# Patient Record
Sex: MALE | Race: WHITE | NOT HISPANIC OR LATINO | Employment: FULL TIME | URBAN - METROPOLITAN AREA
[De-identification: names, ages, dates, MRNs, and addresses within clinical notes are randomized per-mention and may not be internally consistent; named-entity substitution may affect disease eponyms.]

---
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
## 2020-08-17 ENCOUNTER — TRANSFERRED RECORDS (OUTPATIENT)
Dept: HEALTH INFORMATION MANAGEMENT | Facility: CLINIC | Age: 20
End: 2020-08-17

## 2023-06-03 NOTE — PATIENT INSTRUCTIONS
Take antibiotic as directed- Amoxicillin twice daily for 10 days.  Tylenol and/or ibuprofen for pain relief and fever reduction.  Warm compresses next to ear for pain relief.  Drink plenty of fluids and place a humidifier in bedroom.  Mucinex to help reduce fluid in ears (guiafenasin is the generic).  Ear infections are not contagious.  Swimming is ok as long as there is no perforation in the ear drum.   Follow up with primary care provider in 10-14 days if any concern of persistent infection.   eye pain traumatic

## 2024-07-11 ENCOUNTER — NURSE TRIAGE (OUTPATIENT)
Dept: PEDIATRICS | Facility: OTHER | Age: 24
End: 2024-07-11
Payer: COMMERCIAL

## 2024-07-11 ENCOUNTER — HOSPITAL ENCOUNTER (EMERGENCY)
Facility: CLINIC | Age: 24
Discharge: HOME | End: 2024-07-11
Attending: STUDENT IN AN ORGANIZED HEALTH CARE EDUCATION/TRAINING PROGRAM | Admitting: STUDENT IN AN ORGANIZED HEALTH CARE EDUCATION/TRAINING PROGRAM
Payer: COMMERCIAL

## 2024-07-11 ENCOUNTER — APPOINTMENT (OUTPATIENT)
Dept: GENERAL RADIOLOGY | Facility: CLINIC | Age: 24
End: 2024-07-11
Attending: STUDENT IN AN ORGANIZED HEALTH CARE EDUCATION/TRAINING PROGRAM
Payer: COMMERCIAL

## 2024-07-11 VITALS
SYSTOLIC BLOOD PRESSURE: 124 MMHG | TEMPERATURE: 99.7 F | OXYGEN SATURATION: 98 % | RESPIRATION RATE: 17 BRPM | HEIGHT: 70 IN | DIASTOLIC BLOOD PRESSURE: 73 MMHG | BODY MASS INDEX: 24.11 KG/M2 | HEART RATE: 83 BPM

## 2024-07-11 DIAGNOSIS — J18.9 PNEUMONIA OF RIGHT LOWER LOBE DUE TO INFECTIOUS ORGANISM: ICD-10-CM

## 2024-07-11 LAB
ANION GAP SERPL CALCULATED.3IONS-SCNC: 13 MMOL/L (ref 7–15)
BASOPHILS # BLD AUTO: 0 10E3/UL (ref 0–0.2)
BASOPHILS NFR BLD AUTO: 0 %
BUN SERPL-MCNC: 7.4 MG/DL (ref 6–20)
CALCIUM SERPL-MCNC: 9.6 MG/DL (ref 8.6–10)
CHLORIDE SERPL-SCNC: 98 MMOL/L (ref 98–107)
CREAT SERPL-MCNC: 1 MG/DL (ref 0.67–1.17)
DEPRECATED HCO3 PLAS-SCNC: 27 MMOL/L (ref 22–29)
EGFRCR SERPLBLD CKD-EPI 2021: >90 ML/MIN/1.73M2
EOSINOPHIL # BLD AUTO: 0.1 10E3/UL (ref 0–0.7)
EOSINOPHIL NFR BLD AUTO: 2 %
ERYTHROCYTE [DISTWIDTH] IN BLOOD BY AUTOMATED COUNT: 12.3 % (ref 10–15)
GLUCOSE SERPL-MCNC: 103 MG/DL (ref 70–99)
HCT VFR BLD AUTO: 48.5 % (ref 40–53)
HGB BLD-MCNC: 16.4 G/DL (ref 13.3–17.7)
IMM GRANULOCYTES # BLD: 0.1 10E3/UL
IMM GRANULOCYTES NFR BLD: 1 %
LYMPHOCYTES # BLD AUTO: 1.1 10E3/UL (ref 0.8–5.3)
LYMPHOCYTES NFR BLD AUTO: 15 %
MCH RBC QN AUTO: 27.8 PG (ref 26.5–33)
MCHC RBC AUTO-ENTMCNC: 33.8 G/DL (ref 31.5–36.5)
MCV RBC AUTO: 82 FL (ref 78–100)
MONOCYTES # BLD AUTO: 0.6 10E3/UL (ref 0–1.3)
MONOCYTES NFR BLD AUTO: 8 %
NEUTROPHILS # BLD AUTO: 5.4 10E3/UL (ref 1.6–8.3)
NEUTROPHILS NFR BLD AUTO: 74 %
NRBC # BLD AUTO: 0 10E3/UL
NRBC BLD AUTO-RTO: 0 /100
PLATELET # BLD AUTO: 276 10E3/UL (ref 150–450)
POTASSIUM SERPL-SCNC: 4.4 MMOL/L (ref 3.4–5.3)
RBC # BLD AUTO: 5.9 10E6/UL (ref 4.4–5.9)
SODIUM SERPL-SCNC: 138 MMOL/L (ref 135–145)
WBC # BLD AUTO: 7.4 10E3/UL (ref 4–11)

## 2024-07-11 PROCEDURE — 99284 EMERGENCY DEPT VISIT MOD MDM: CPT | Mod: 25 | Performed by: STUDENT IN AN ORGANIZED HEALTH CARE EDUCATION/TRAINING PROGRAM

## 2024-07-11 PROCEDURE — 36415 COLL VENOUS BLD VENIPUNCTURE: CPT | Performed by: STUDENT IN AN ORGANIZED HEALTH CARE EDUCATION/TRAINING PROGRAM

## 2024-07-11 PROCEDURE — 96375 TX/PRO/DX INJ NEW DRUG ADDON: CPT | Performed by: STUDENT IN AN ORGANIZED HEALTH CARE EDUCATION/TRAINING PROGRAM

## 2024-07-11 PROCEDURE — 250N000013 HC RX MED GY IP 250 OP 250 PS 637: Performed by: STUDENT IN AN ORGANIZED HEALTH CARE EDUCATION/TRAINING PROGRAM

## 2024-07-11 PROCEDURE — 99284 EMERGENCY DEPT VISIT MOD MDM: CPT | Performed by: STUDENT IN AN ORGANIZED HEALTH CARE EDUCATION/TRAINING PROGRAM

## 2024-07-11 PROCEDURE — 258N000003 HC RX IP 258 OP 636: Performed by: STUDENT IN AN ORGANIZED HEALTH CARE EDUCATION/TRAINING PROGRAM

## 2024-07-11 PROCEDURE — 85025 COMPLETE CBC W/AUTO DIFF WBC: CPT | Performed by: STUDENT IN AN ORGANIZED HEALTH CARE EDUCATION/TRAINING PROGRAM

## 2024-07-11 PROCEDURE — 250N000011 HC RX IP 250 OP 636: Performed by: STUDENT IN AN ORGANIZED HEALTH CARE EDUCATION/TRAINING PROGRAM

## 2024-07-11 PROCEDURE — 71046 X-RAY EXAM CHEST 2 VIEWS: CPT

## 2024-07-11 PROCEDURE — 96365 THER/PROPH/DIAG IV INF INIT: CPT | Performed by: STUDENT IN AN ORGANIZED HEALTH CARE EDUCATION/TRAINING PROGRAM

## 2024-07-11 PROCEDURE — 80048 BASIC METABOLIC PNL TOTAL CA: CPT | Performed by: STUDENT IN AN ORGANIZED HEALTH CARE EDUCATION/TRAINING PROGRAM

## 2024-07-11 PROCEDURE — 96361 HYDRATE IV INFUSION ADD-ON: CPT | Performed by: STUDENT IN AN ORGANIZED HEALTH CARE EDUCATION/TRAINING PROGRAM

## 2024-07-11 RX ORDER — AZITHROMYCIN 250 MG/1
500 TABLET, FILM COATED ORAL ONCE
Status: COMPLETED | OUTPATIENT
Start: 2024-07-11 | End: 2024-07-11

## 2024-07-11 RX ORDER — ONDANSETRON 2 MG/ML
4 INJECTION INTRAMUSCULAR; INTRAVENOUS ONCE
Status: COMPLETED | OUTPATIENT
Start: 2024-07-11 | End: 2024-07-11

## 2024-07-11 RX ORDER — IBUPROFEN 400 MG/1
400 TABLET, FILM COATED ORAL EVERY 6 HOURS PRN
COMMUNITY

## 2024-07-11 RX ORDER — AZITHROMYCIN 250 MG/1
TABLET, FILM COATED ORAL
COMMUNITY
Start: 2024-07-09

## 2024-07-11 RX ORDER — KETOROLAC TROMETHAMINE 30 MG/ML
30 INJECTION, SOLUTION INTRAMUSCULAR; INTRAVENOUS ONCE
Status: COMPLETED | OUTPATIENT
Start: 2024-07-11 | End: 2024-07-11

## 2024-07-11 RX ORDER — CEFTRIAXONE 2 G/1
2 INJECTION, POWDER, FOR SOLUTION INTRAMUSCULAR; INTRAVENOUS ONCE
Status: COMPLETED | OUTPATIENT
Start: 2024-07-11 | End: 2024-07-11

## 2024-07-11 RX ADMIN — CEFTRIAXONE SODIUM 2 G: 2 INJECTION, POWDER, FOR SOLUTION INTRAMUSCULAR; INTRAVENOUS at 19:21

## 2024-07-11 RX ADMIN — SODIUM CHLORIDE 1000 ML: 9 INJECTION, SOLUTION INTRAVENOUS at 19:20

## 2024-07-11 RX ADMIN — SODIUM CHLORIDE 1000 ML: 9 INJECTION, SOLUTION INTRAVENOUS at 18:12

## 2024-07-11 RX ADMIN — KETOROLAC TROMETHAMINE 30 MG: 30 INJECTION, SOLUTION INTRAMUSCULAR at 18:12

## 2024-07-11 RX ADMIN — AZITHROMYCIN DIHYDRATE 500 MG: 250 TABLET, FILM COATED ORAL at 19:21

## 2024-07-11 ASSESSMENT — COLUMBIA-SUICIDE SEVERITY RATING SCALE - C-SSRS
6. HAVE YOU EVER DONE ANYTHING, STARTED TO DO ANYTHING, OR PREPARED TO DO ANYTHING TO END YOUR LIFE?: NO
2. HAVE YOU ACTUALLY HAD ANY THOUGHTS OF KILLING YOURSELF IN THE PAST MONTH?: NO
1. IN THE PAST MONTH, HAVE YOU WISHED YOU WERE DEAD OR WISHED YOU COULD GO TO SLEEP AND NOT WAKE UP?: NO

## 2024-07-11 ASSESSMENT — ENCOUNTER SYMPTOMS
CHILLS: 1
ABDOMINAL PAIN: 0
NAUSEA: 1
COUGH: 1
SHORTNESS OF BREATH: 0
FATIGUE: 1
FEVER: 1

## 2024-07-11 ASSESSMENT — ACTIVITIES OF DAILY LIVING (ADL)
ADLS_ACUITY_SCORE: 33
ADLS_ACUITY_SCORE: 35
ADLS_ACUITY_SCORE: 33
ADLS_ACUITY_SCORE: 35

## 2024-07-11 NOTE — ED TRIAGE NOTES
Pt reports a cough, fever, fatigue and loss of appetite that started last Thursday. Was seen on Tuesday and diagnosed with pneumonia and started on an antibiotic but states he isn't getting any better.       Triage Assessment (Adult)       Row Name 07/11/24 1610          Respiratory WDL    Respiratory WDL X;cough     Cough Frequency frequent

## 2024-07-11 NOTE — ED PROVIDER NOTES
History     Chief Complaint   Patient presents with    Cough    Fever     HPI  Markell Fraire is a 23 year old male presents for reevaluation for pneumonia.  He notes he was diagnosed with pneumonia on Tuesday was started on antibiotics and took his antibiotics that evening however does not feel better today and wanted to be evaluated.  He notes the nurse wanted him to have blood cultures drawn.  He had not taken any medication since 9 AM this morning.  He had intermittent nausea as well today.  He is up-to-date on his vaccinations and does not suffer any acute pathology in regards to medical history and is not taking other medications.    Allergies:  Allergies   Allergen Reactions    Seasonal Allergies        Problem List:    Patient Active Problem List    Diagnosis Date Noted    Overweight 10/07/2016     Priority: Medium    Seasonal allergies 07/13/2014     Priority: Medium        Past Medical History:    Past Medical History:   Diagnosis Date    Rotavirus enteritis As an infant       Past Surgical History:    Past Surgical History:   Procedure Laterality Date    TONSILLECTOMY & ADENOIDECTOMY  Around age 10       Family History:    Family History   Problem Relation Age of Onset    Hypertension No family hx of     Other Cancer No family hx of     Anesthesia Reaction No family hx of     Obesity No family hx of        Social History:  Marital Status:  Single [1]  Social History     Tobacco Use    Smoking status: Never    Smokeless tobacco: Never   Substance Use Topics    Alcohol use: No    Drug use: No        Medications:    Acetaminophen (TYLENOL PO)  ibuprofen (ADVIL/MOTRIN) 400 MG tablet  amoxicillin-clavulanate (AUGMENTIN) 875-125 MG tablet  azithromycin (ZITHROMAX) 250 MG tablet  fexofenadine (ALLEGRA) 180 MG tablet  FLONASE 50 MCG/ACT NA SUSP  olopatadine HCl (PATADAY) 0.2 % SOLN  rizatriptan (MAXALT) 5 MG tablet          Review of Systems   Constitutional:  Positive for chills, fatigue and fever.  "  Respiratory:  Positive for cough. Negative for shortness of breath.    Cardiovascular:  Positive for chest pain.   Gastrointestinal:  Positive for nausea. Negative for abdominal pain.   Skin:  Negative for rash.   All other systems reviewed and are negative.      Physical Exam   BP: 135/80  Pulse: 94  Temp: 99.7  F (37.6  C)  Resp: 20  Height: 177.8 cm (5' 10\")  SpO2: 98 %      Physical Exam  Vitals and nursing note reviewed.   Constitutional:       General: He is not in acute distress.     Appearance: Normal appearance. He is normal weight. He is not ill-appearing, toxic-appearing or diaphoretic.   HENT:      Head: Normocephalic and atraumatic.      Right Ear: Tympanic membrane normal.      Left Ear: Tympanic membrane normal.   Cardiovascular:      Rate and Rhythm: Normal rate.      Pulses: Normal pulses.      Heart sounds: Normal heart sounds.   Pulmonary:      Effort: Pulmonary effort is normal. No respiratory distress.      Breath sounds: Normal breath sounds. No wheezing.   Abdominal:      General: Abdomen is flat. Bowel sounds are normal.      Palpations: Abdomen is soft.   Musculoskeletal:         General: Normal range of motion.   Skin:     General: Skin is warm and dry.      Capillary Refill: Capillary refill takes less than 2 seconds.   Neurological:      General: No focal deficit present.      Mental Status: He is alert and oriented to person, place, and time.   Psychiatric:         Mood and Affect: Mood normal.         ED Course        Procedures             Results for orders placed or performed during the hospital encounter of 07/11/24 (from the past 24 hour(s))   CBC with platelets differential    Narrative    The following orders were created for panel order CBC with platelets differential.  Procedure                               Abnormality         Status                     ---------                               -----------         ------                     CBC with platelets and " cleve..[[id number]]                      Final result                 Please view results for these tests on the individual orders.   Basic metabolic panel   Result Value Ref Range    Sodium 138 135 - 145 mmol/L    Potassium 4.4 3.4 - 5.3 mmol/L    Chloride 98 98 - 107 mmol/L    Carbon Dioxide (CO2) 27 22 - 29 mmol/L    Anion Gap 13 7 - 15 mmol/L    Urea Nitrogen 7.4 6.0 - 20.0 mg/dL    Creatinine 1.00 0.67 - 1.17 mg/dL    GFR Estimate >90 >60 mL/min/1.73m2    Calcium 9.6 8.6 - 10.0 mg/dL    Glucose 103 (H) 70 - 99 mg/dL   CBC with platelets and differential   Result Value Ref Range    WBC Count 7.4 4.0 - 11.0 10e3/uL    RBC Count 5.90 4.40 - 5.90 10e6/uL    Hemoglobin 16.4 13.3 - 17.7 g/dL    Hematocrit 48.5 40.0 - 53.0 %    MCV 82 78 - 100 fL    MCH 27.8 26.5 - 33.0 pg    MCHC 33.8 31.5 - 36.5 g/dL    RDW 12.3 10.0 - 15.0 %    Platelet Count 276 150 - 450 10e3/uL    % Neutrophils 74 %    % Lymphocytes 15 %    % Monocytes 8 %    % Eosinophils 2 %    % Basophils 0 %    % Immature Granulocytes 1 %    NRBCs per 100 WBC 0 <1 /100    Absolute Neutrophils 5.4 1.6 - 8.3 10e3/uL    Absolute Lymphocytes 1.1 0.8 - 5.3 10e3/uL    Absolute Monocytes 0.6 0.0 - 1.3 10e3/uL    Absolute Eosinophils 0.1 0.0 - 0.7 10e3/uL    Absolute Basophils 0.0 0.0 - 0.2 10e3/uL    Absolute Immature Granulocytes 0.1 <=0.4 10e3/uL    Absolute NRBCs 0.0 10e3/uL   XR Chest 2 Views    Narrative    EXAM: XR CHEST 2 VIEWS  LOCATION: [facility]  DATE: 7/11/2024    INDICATION: cough and fever  COMPARISON: 12/31/2017      Impression    IMPRESSION: Heart size normal. Patchy airspace disease right mid lung and posterior left lower lobe consistent with pneumonia.       Medications   cefTRIAXone (ROCEPHIN) 2 g vial to attach to  ml bag for ADULTS or NS 50 ml bag for PEDS (has no administration in time range)   sodium chloride 0.9% BOLUS 1,000 mL (has no administration in time range)   azithromycin (ZITHROMAX) tablet 500 mg  (has no administration in time range)   sodium chloride 0.9% BOLUS 1,000 mL (1,000 mLs Intravenous $New Bag 7/11/24 1812)   ketorolac (TORADOL) injection 30 mg (30 mg Intravenous $Given 7/11/24 1812)   ondansetron (ZOFRAN) injection 4 mg (4 mg Intravenous Not Given 7/11/24 1820)       Assessments & Plan (with Medical Decision Making)     I have reviewed the nursing notes.    I have reviewed the findings, diagnosis, plan and need for follow up with the patient.      Medical Decision Making  Pleasant 23-year-old male presenting with diaphoresis and general body aches and a cough.  He is recent for pneumonia has been taking Augmentin and azithromycin.  He is currently on day 3 and has only taken 2 doses.  He is otherwise healthy and does not have any chronic medical additions.  His examination is benign aside from a mildly unwell appearing 23-year-old.  He is provided with a liter bolus of fluids.  Chest imaging is consistent with pneumonia of the right lower lobe.  His lab work was reassuring without acute signs of sepsis as well as vitals.  Do not believe patient requires any further CT imaging or lab work.  Patient is provided with a dose of Rocephin and azithromycin as well as another liter bolus of fluids.  His vitals remained stable.  At this time believe patient stable for discharge home and outpatient follow-up recommendations been provided for patient.  Patient and dad are happy with plan and patient discharged home    New Prescriptions    No medications on file       Final diagnoses:   Pneumonia of right lower lobe due to infectious organism       7/11/2024   [facility] EMERGENCY DEPT       Koffi Sanon MD  07/11/24 [id number]

## 2024-07-11 NOTE — TELEPHONE ENCOUNTER
Patient was diagnosed with pneumonia on Tuesday.    He has been on the medication for 48 hours.    His breathing is about the same.    He is now vomiting and he has diarrhea.  Patient has only vomited once.  He has diarrhea.  He is eating ice cream.  He is urinating well.    Per protocol patient should be seen. Patient agrees with the plan.  Jackie Schuler RN on 7/11/2024 at 3:22 PM          Reason for Disposition   [1] Taking antibiotic > 48 hours (2 days) for pneumonia AND [2] breathing not improved    Additional Information   Negative: Severe difficulty breathing (e.g., struggling for each breath, speaks in single words)   Negative: Bluish (or gray) lips or face now   Negative: Difficult to awaken or acting confused (e.g., disoriented, slurred speech)   Negative: Stridor   Negative: Slow, shallow and weak breathing   Negative: Sounds like a life-threatening emergency to the triager   Negative: Recently discharged from hospital with diagnosis of pneumonia   Negative: New onset or worsening chest pain   Negative: MODERATE difficulty breathing (e.g., speaks in phrases, SOB even at rest, pulse 100-120)   Negative: Fever > 104 F (40 C)   Negative: [1] Taking antibiotic > 24 hours for pneumonia AND [2] fever > 103 F (39.4 C)   Negative: [1] Coughed up blood AND [2] large amount or blood clots (Exception: blood-tinged sputum)   Negative: Patient sounds very sick or weak to the triager   Negative: [1] Diabetes mellitus or weak immune system (e.g., HIV positive, cancer chemo, splenectomy, organ transplant, chronic steroids) AND [2] new onset of fever > 100.0 F (37.8 C)   Negative: [1] Taking antibiotic > 24 hours for pneumonia AND [2] breathing is worse   Negative: [1] Recent medical visit within 24 hours AND [2] symptoms worse (Exception: fever worse)   Negative: [1] Taking antibiotic > 24 hours for pneumonia AND [2] new onset of fever   Negative: [1] Taking antibiotic > 48 hours (2 days) for pneumonia AND [2] fever  persists or recurs    Protocols used: Pneumonia on Antibiotic Follow-up Call-[facility]

## 2024-07-12 NOTE — ED PROVIDER NOTES
Assumed care at shift change from Dr. Sanon.  See his note for patient presenting details initial workup.  Briefly, this is a 23-year-old male presenting for concern of pneumonia.  Was diagnosed with pneumonia 2 days ago and started on antibiotics.  Associated with intermittent nausea.  Is receiving IV fluids.  Is also receiving IV doses of antibiotics.  Will get a dose of Toradol and Zofran.  After receiving antibiotics, plan will be to discharge.    Received IV antibiotics, tolerated this well.  Patient had been set up for discharge, and had left the department before being evaluated by this provider.  He was vitally stable upon discharge.  Did not see or examine this patient.     Amy Gutierrez, DO  07/11/24 2032

## 2024-07-12 NOTE — DISCHARGE INSTRUCTIONS
Follow-up with your primary care team if needed by Monday if no significant improvement.  We do expect some improvement on day 3 to day 4 of antibiotics.  We provided you with some IV Rocephin and to continue her Augmentin prescription and azithromycin tomorrow.  If any worsening symptoms does occur over the next 48 hours please feel free to return for reevaluation.

## 2024-07-15 ENCOUNTER — APPOINTMENT (OUTPATIENT)
Dept: CT IMAGING | Facility: CLINIC | Age: 24
End: 2024-07-15
Attending: NURSE PRACTITIONER
Payer: COMMERCIAL

## 2024-07-15 ENCOUNTER — HOSPITAL ENCOUNTER (EMERGENCY)
Facility: CLINIC | Age: 24
Discharge: HOME | End: 2024-07-15
Attending: NURSE PRACTITIONER | Admitting: NURSE PRACTITIONER
Payer: COMMERCIAL

## 2024-07-15 VITALS
TEMPERATURE: 99 F | SYSTOLIC BLOOD PRESSURE: 125 MMHG | WEIGHT: 195.8 LBS | DIASTOLIC BLOOD PRESSURE: 72 MMHG | RESPIRATION RATE: 18 BRPM | BODY MASS INDEX: 28.09 KG/M2 | OXYGEN SATURATION: 97 % | HEART RATE: 88 BPM

## 2024-07-15 DIAGNOSIS — J18.9 MULTIFOCAL PNEUMONIA: ICD-10-CM

## 2024-07-15 LAB
ALBUMIN SERPL BCG-MCNC: 3.8 G/DL (ref 3.5–5.2)
ALP SERPL-CCNC: 111 U/L (ref 40–150)
ALT SERPL W P-5'-P-CCNC: 43 U/L (ref 0–70)
ANION GAP SERPL CALCULATED.3IONS-SCNC: 10 MMOL/L (ref 7–15)
AST SERPL W P-5'-P-CCNC: 39 U/L (ref 0–45)
BASE EXCESS BLDV CALC-SCNC: 4 MMOL/L (ref -3–3)
BASOPHILS # BLD AUTO: 0.1 10E3/UL (ref 0–0.2)
BASOPHILS NFR BLD AUTO: 1 %
BILIRUB SERPL-MCNC: 0.4 MG/DL
BUN SERPL-MCNC: 13.1 MG/DL (ref 6–20)
CALCIUM SERPL-MCNC: 9.3 MG/DL (ref 8.6–10)
CHLORIDE SERPL-SCNC: 100 MMOL/L (ref 98–107)
CREAT SERPL-MCNC: 1.12 MG/DL (ref 0.67–1.17)
CRP SERPL-MCNC: 32.99 MG/L
EGFRCR SERPLBLD CKD-EPI 2021: >90 ML/MIN/1.73M2
EOSINOPHIL # BLD AUTO: 0.2 10E3/UL (ref 0–0.7)
EOSINOPHIL NFR BLD AUTO: 2 %
ERYTHROCYTE [DISTWIDTH] IN BLOOD BY AUTOMATED COUNT: 12.4 % (ref 10–15)
FLUAV RNA SPEC QL NAA+PROBE: NEGATIVE
FLUBV RNA RESP QL NAA+PROBE: NEGATIVE
GLUCOSE SERPL-MCNC: 93 MG/DL (ref 70–99)
HCO3 BLDV-SCNC: 30 MMOL/L (ref 21–28)
HCO3 SERPL-SCNC: 27 MMOL/L (ref 22–29)
HCT VFR BLD AUTO: 45.3 % (ref 40–53)
HGB BLD-MCNC: 15.4 G/DL (ref 13.3–17.7)
IMM GRANULOCYTES # BLD: 0.2 10E3/UL
IMM GRANULOCYTES NFR BLD: 2 %
LACTATE SERPL-SCNC: 0.9 MMOL/L (ref 0.7–2)
LYMPHOCYTES # BLD AUTO: 1.7 10E3/UL (ref 0.8–5.3)
LYMPHOCYTES NFR BLD AUTO: 17 %
MCH RBC QN AUTO: 27.9 PG (ref 26.5–33)
MCHC RBC AUTO-ENTMCNC: 34 G/DL (ref 31.5–36.5)
MCV RBC AUTO: 82 FL (ref 78–100)
MONOCYTES # BLD AUTO: 0.9 10E3/UL (ref 0–1.3)
MONOCYTES NFR BLD AUTO: 9 %
NEUTROPHILS # BLD AUTO: 7.1 10E3/UL (ref 1.6–8.3)
NEUTROPHILS NFR BLD AUTO: 70 %
NRBC # BLD AUTO: 0 10E3/UL
NRBC BLD AUTO-RTO: 0 /100
O2/TOTAL GAS SETTING VFR VENT: 21 %
OXYHGB MFR BLDV: 22 % (ref 70–75)
PCO2 BLDV: 50 MM HG (ref 40–50)
PH BLDV: 7.39 [PH] (ref 7.32–7.43)
PLATELET # BLD AUTO: 332 10E3/UL (ref 150–450)
PO2 BLDV: 18 MM HG (ref 25–47)
POTASSIUM SERPL-SCNC: 4.4 MMOL/L (ref 3.4–5.3)
PROCALCITONIN SERPL IA-MCNC: 0.22 NG/ML
PROT SERPL-MCNC: 7.6 G/DL (ref 6.4–8.3)
RBC # BLD AUTO: 5.51 10E6/UL (ref 4.4–5.9)
RSV RNA SPEC NAA+PROBE: NEGATIVE
SAO2 % BLDV: 22.4 % (ref 70–75)
SARS-COV-2 RNA RESP QL NAA+PROBE: NEGATIVE
SODIUM SERPL-SCNC: 137 MMOL/L (ref 135–145)
WBC # BLD AUTO: 10.2 10E3/UL (ref 4–11)

## 2024-07-15 PROCEDURE — 87581 M.PNEUMON DNA AMP PROBE: CPT | Performed by: NURSE PRACTITIONER

## 2024-07-15 PROCEDURE — 99285 EMERGENCY DEPT VISIT HI MDM: CPT | Mod: 25 | Performed by: NURSE PRACTITIONER

## 2024-07-15 PROCEDURE — 84450 TRANSFERASE (AST) (SGOT): CPT | Performed by: NURSE PRACTITIONER

## 2024-07-15 PROCEDURE — 86140 C-REACTIVE PROTEIN: CPT | Performed by: NURSE PRACTITIONER

## 2024-07-15 PROCEDURE — 82040 ASSAY OF SERUM ALBUMIN: CPT | Performed by: NURSE PRACTITIONER

## 2024-07-15 PROCEDURE — 250N000011 HC RX IP 250 OP 636: Performed by: NURSE PRACTITIONER

## 2024-07-15 PROCEDURE — 258N000003 HC RX IP 258 OP 636: Performed by: NURSE PRACTITIONER

## 2024-07-15 PROCEDURE — 82805 BLOOD GASES W/O2 SATURATION: CPT | Performed by: NURSE PRACTITIONER

## 2024-07-15 PROCEDURE — 99284 EMERGENCY DEPT VISIT MOD MDM: CPT | Performed by: NURSE PRACTITIONER

## 2024-07-15 PROCEDURE — 96360 HYDRATION IV INFUSION INIT: CPT | Mod: 59 | Performed by: NURSE PRACTITIONER

## 2024-07-15 PROCEDURE — 71275 CT ANGIOGRAPHY CHEST: CPT

## 2024-07-15 PROCEDURE — 250N000009 HC RX 250: Performed by: NURSE PRACTITIONER

## 2024-07-15 PROCEDURE — 83605 ASSAY OF LACTIC ACID: CPT | Performed by: NURSE PRACTITIONER

## 2024-07-15 PROCEDURE — 87637 SARSCOV2&INF A&B&RSV AMP PRB: CPT | Performed by: NURSE PRACTITIONER

## 2024-07-15 PROCEDURE — 36415 COLL VENOUS BLD VENIPUNCTURE: CPT | Performed by: NURSE PRACTITIONER

## 2024-07-15 PROCEDURE — 84145 PROCALCITONIN (PCT): CPT | Performed by: NURSE PRACTITIONER

## 2024-07-15 PROCEDURE — 85004 AUTOMATED DIFF WBC COUNT: CPT | Performed by: NURSE PRACTITIONER

## 2024-07-15 RX ORDER — IOPAMIDOL 755 MG/ML
500 INJECTION, SOLUTION INTRAVASCULAR ONCE
Status: COMPLETED | OUTPATIENT
Start: 2024-07-15 | End: 2024-07-15

## 2024-07-15 RX ORDER — ALBUTEROL SULFATE 90 UG/1
2 AEROSOL, METERED RESPIRATORY (INHALATION) EVERY 6 HOURS PRN
Qty: 18 G | Refills: 0 | Status: SHIPPED | OUTPATIENT
Start: 2024-07-15

## 2024-07-15 RX ADMIN — SODIUM CHLORIDE 70 ML: 9 INJECTION, SOLUTION INTRAVENOUS at 19:42

## 2024-07-15 RX ADMIN — SODIUM CHLORIDE 1000 ML: 9 INJECTION, SOLUTION INTRAVENOUS at 19:11

## 2024-07-15 RX ADMIN — IOPAMIDOL 70 ML: 755 INJECTION, SOLUTION INTRAVENOUS at 19:43

## 2024-07-15 ASSESSMENT — COLUMBIA-SUICIDE SEVERITY RATING SCALE - C-SSRS
2. HAVE YOU ACTUALLY HAD ANY THOUGHTS OF KILLING YOURSELF IN THE PAST MONTH?: NO
6. HAVE YOU EVER DONE ANYTHING, STARTED TO DO ANYTHING, OR PREPARED TO DO ANYTHING TO END YOUR LIFE?: NO
1. IN THE PAST MONTH, HAVE YOU WISHED YOU WERE DEAD OR WISHED YOU COULD GO TO SLEEP AND NOT WAKE UP?: NO

## 2024-07-15 ASSESSMENT — ACTIVITIES OF DAILY LIVING (ADL)
ADLS_ACUITY_SCORE: 33
ADLS_ACUITY_SCORE: 35
ADLS_ACUITY_SCORE: 33
ADLS_ACUITY_SCORE: 35

## 2024-07-15 NOTE — ED TRIAGE NOTES
Pt presents with cough and fever since 07/04/2024. Pt diagnosed with pneumonia recently. On antibiotics. Pt having shortness of breath and not feeling any better. Pt in no distress      Triage Assessment (Adult)       Row Name 07/15/24 1713          Triage Assessment    Airway WDL WDL        Respiratory WDL    Respiratory WDL X;cough     Cough Frequency frequent        Skin Circulation/Temperature WDL    Skin Circulation/Temperature WDL WDL        Cardiac WDL    Cardiac WDL WDL

## 2024-07-16 ENCOUNTER — NURSE TRIAGE (OUTPATIENT)
Dept: FAMILY MEDICINE | Facility: OTHER | Age: 24
End: 2024-07-16
Payer: COMMERCIAL

## 2024-07-16 LAB
C PNEUM DNA SPEC QL NAA+PROBE: NOT DETECTED
FLUAV H1 2009 PAND RNA SPEC QL NAA+PROBE: NOT DETECTED
FLUAV H1 RNA SPEC QL NAA+PROBE: NOT DETECTED
FLUAV H3 RNA SPEC QL NAA+PROBE: NOT DETECTED
FLUAV RNA SPEC QL NAA+PROBE: NOT DETECTED
FLUBV RNA SPEC QL NAA+PROBE: NOT DETECTED
HADV DNA SPEC QL NAA+PROBE: NOT DETECTED
HCOV PNL SPEC NAA+PROBE: NOT DETECTED
HMPV RNA SPEC QL NAA+PROBE: NOT DETECTED
HPIV1 RNA SPEC QL NAA+PROBE: NOT DETECTED
HPIV2 RNA SPEC QL NAA+PROBE: NOT DETECTED
HPIV3 RNA SPEC QL NAA+PROBE: NOT DETECTED
HPIV4 RNA SPEC QL NAA+PROBE: NOT DETECTED
M PNEUMO DNA SPEC QL NAA+PROBE: NOT DETECTED
RSV RNA SPEC QL NAA+PROBE: NOT DETECTED
RSV RNA SPEC QL NAA+PROBE: NOT DETECTED
RV+EV RNA SPEC QL NAA+PROBE: NOT DETECTED

## 2024-07-16 NOTE — TELEPHONE ENCOUNTER
RN called pt. Mother- mother states spoke with another triage nurse that informed her to go to      Closing encounter as this is pt and mothers plan.     Mecca Sidhu RN

## 2024-07-16 NOTE — DISCHARGE INSTRUCTIONS
Viral PCR panel is still pending.  Finish course of antibiotics.   You can try the albuterol inhaler 2 puffs every 4 hours as needed for shortness of breath to see if it helps with breathing.    Make appointment for recheck in clinic in the next week.  Return to the emergency department for fevers, vomiting, increased difficulty breathing, or worse in any way.

## 2024-07-16 NOTE — ED PROVIDER NOTES
History     Chief Complaint   Patient presents with    Cough    Fever     HPI    Markell Fraire is a 23 year old male who presents for evaluation of ongoing cough and shortness of breath.  Symptoms started 6 days ago with productive cough and fever. He was evaluated at urgent care and was diagnoses with pneumonia. He was started on antibiotics (Z-Anibal and Augmentin).  Patient was seen here in the emergency department 4 days ago for recheck of his symptoms and had chest x-ray showing patchy opacity in the right midlung and posterior left lower lobe, consistent with pneumonia.  He has completed the course of Azithromycin and has a few doses of Augmentin left.  He notes fevers have resolved in the last 24 hours, but he is now becoming more short of breath.  He has difficulty breathing with any exertion, feels very labored.  Extreme fatigue.  No prior history of asthma.  He is otherwise healthy and takes no regular medications.    Allergies:  Allergies   Allergen Reactions    Seasonal Allergies        Problem List:    Patient Active Problem List    Diagnosis Date Noted    Overweight 10/07/2016     Priority: Medium    Seasonal allergies 07/13/2014     Priority: Medium        Past Medical History:    Past Medical History:   Diagnosis Date    Rotavirus enteritis As an infant       Past Surgical History:    Past Surgical History:   Procedure Laterality Date    TONSILLECTOMY & ADENOIDECTOMY  Around age 10       Family History:    Family History   Problem Relation Age of Onset    Hypertension No family hx of     Other Cancer No family hx of     Anesthesia Reaction No family hx of     Obesity No family hx of        Social History:  Marital Status:  Single [1]  Social History     Tobacco Use    Smoking status: Never    Smokeless tobacco: Never   Substance Use Topics    Alcohol use: No    Drug use: No        Medications:    albuterol (PROAIR HFA/PROVENTIL HFA/VENTOLIN HFA) 108 (90 Base) MCG/ACT inhaler  Acetaminophen  (TYLENOL PO)  amoxicillin-clavulanate (AUGMENTIN) 875-125 MG tablet  azithromycin (ZITHROMAX) 250 MG tablet  fexofenadine (ALLEGRA) 180 MG tablet  FLONASE 50 MCG/ACT NA SUSP  ibuprofen (ADVIL/MOTRIN) 400 MG tablet  olopatadine HCl (PATADAY) 0.2 % SOLN  rizatriptan (MAXALT) 5 MG tablet          Review of Systems  As mentioned above in the history present illness. All other systems were reviewed and are negative.    Physical Exam   BP: 121/70  Pulse: 103  Temp: 99  F (37.2  C)  Resp: 16  Weight: 88.8 kg (195 lb 12.8 oz)  SpO2: 95 %      Physical Exam  Constitutional:       General: He is not in acute distress.     Appearance: He is well-developed. He is ill-appearing.   HENT:      Head: Normocephalic and atraumatic.      Right Ear: External ear normal.      Left Ear: External ear normal.      Nose: Nose normal.      Mouth/Throat:      Mouth: Mucous membranes are moist.   Eyes:      Conjunctiva/sclera: Conjunctivae normal.   Cardiovascular:      Rate and Rhythm: Regular rhythm. Tachycardia present.      Heart sounds: Normal heart sounds. No murmur heard.  Pulmonary:      Effort: Pulmonary effort is normal. No respiratory distress.      Breath sounds: Rhonchi (bilateral lower fields.) present.   Musculoskeletal:         General: Normal range of motion.   Skin:     General: Skin is warm and dry.      Findings: No rash.   Neurological:      General: No focal deficit present.      Mental Status: He is alert and oriented to person, place, and time.         ED Course        Procedures            Results for orders placed or performed during the hospital encounter of 07/15/24 (from the past 24 hour(s))   CBC with platelets differential    Narrative    The following orders were created for panel order CBC with platelets differential.  Procedure                               Abnormality         Status                     ---------                               -----------         ------                     CBC with platelets  mohamud nuñez.[[id number]]                      Final result                 Please view results for these tests on the individual orders.   Comprehensive metabolic panel   Result Value Ref Range    Sodium 137 135 - 145 mmol/L    Potassium 4.4 3.4 - 5.3 mmol/L    Carbon Dioxide (CO2) 27 22 - 29 mmol/L    Anion Gap 10 7 - 15 mmol/L    Urea Nitrogen 13.1 6.0 - 20.0 mg/dL    Creatinine 1.12 0.67 - 1.17 mg/dL    GFR Estimate >90 >60 mL/min/1.73m2    Calcium 9.3 8.6 - 10.0 mg/dL    Chloride 100 98 - 107 mmol/L    Glucose 93 70 - 99 mg/dL    Alkaline Phosphatase 111 40 - 150 U/L    AST 39 0 - 45 U/L    ALT 43 0 - 70 U/L    Protein Total 7.6 6.4 - 8.3 g/dL    Albumin 3.8 3.5 - 5.2 g/dL    Bilirubin Total 0.4 <=1.2 mg/dL   Lactic acid whole blood with 1x repeat in 2 hr when >2   Result Value Ref Range    Lactic Acid, Initial 0.9 0.7 - 2.0 mmol/L   Blood gas venous   Result Value Ref Range    pH Venous 7.39 7.32 - 7.43    pCO2 Venous 50 40 - 50 mm Hg    pO2 Venous 18 (L) 25 - 47 mm Hg    Bicarbonate Venous 30 (H) 21 - 28 mmol/L    Base Excess/Deficit Venous 4.0 (H) -3.0 - 3.0 mmol/L    FIO2 21     Oxyhemoglobin Venous 22 (L) 70 - 75 %    O2 Sat, Venous 22.4 (L) 70.0 - 75.0 %    Narrative    In healthy individuals, oxyhemoglobin (O2Hb) and oxygen saturation (SO2) are approximately equal. In the presence of dyshemoglobins, oxyhemoglobin can be considerably lower than oxygen saturation.   CBC with platelets and differential   Result Value Ref Range    WBC Count 10.2 4.0 - 11.0 10e3/uL    RBC Count 5.51 4.40 - 5.90 10e6/uL    Hemoglobin 15.4 13.3 - 17.7 g/dL    Hematocrit 45.3 40.0 - 53.0 %    MCV 82 78 - 100 fL    MCH 27.9 26.5 - 33.0 pg    MCHC 34.0 31.5 - 36.5 g/dL    RDW 12.4 10.0 - 15.0 %    Platelet Count 332 150 - 450 10e3/uL    % Neutrophils 70 %    % Lymphocytes 17 %    % Monocytes 9 %    % Eosinophils 2 %    % Basophils 1 %    % Immature Granulocytes 2 %    NRBCs per 100 WBC 0 <1 /100    Absolute Neutrophils 7.1 1.6 - 8.3  10e3/uL    Absolute Lymphocytes 1.7 0.8 - 5.3 10e3/uL    Absolute Monocytes 0.9 0.0 - 1.3 10e3/uL    Absolute Eosinophils 0.2 0.0 - 0.7 10e3/uL    Absolute Basophils 0.1 0.0 - 0.2 10e3/uL    Absolute Immature Granulocytes 0.2 <=0.4 10e3/uL    Absolute NRBCs 0.0 10e3/uL   CRP inflammation   Result Value Ref Range    CRP Inflammation 32.99 (H) <5.00 mg/L   Procalcitonin   Result Value Ref Range    Procalcitonin 0.22 <0.50 ng/mL   CT Chest Pulmonary Embolism w Contrast    Narrative    EXAM: CT CHEST PULMONARY EMBOLISM W CONTRAST  LOCATION: [facility]  DATE: 7/15/2024    INDICATION: short of breath dyspnea.  COMPARISON: None.  TECHNIQUE: CT chest pulmonary angiogram during arterial phase injection of IV contrast. Multiplanar reformats and MIP reconstructions were performed. Dose reduction techniques were used.   CONTRAST: Isovue 370 70ml    FINDINGS:  ANGIOGRAM CHEST: Pulmonary arteries are normal caliber and negative for pulmonary emboli. Thoracic aorta is negative for dissection. No CT evidence of right heart strain.    LUNGS AND PLEURA: Consolidation involving much of left lower lobe superior segment of right lower lobe. Patchy airspace infiltrates present elsewhere within both lungs consistent with pneumonia. Mild inflammatory bronchial wall thickening. No pleural   effusion.    MEDIASTINUM/AXILLAE: Normal.    CORONARY ARTERY CALCIFICATION: None.    UPPER ABDOMEN: Normal.    MUSCULOSKELETAL: Normal.      Impression    IMPRESSION:  1.  No pulmonary emboli.  2.  Bilateral pneumonia.   Symptomatic Influenza A/B, RSV, & SARS-CoV2 PCR (COVID-19) Nose    Specimen: Nose; Swab   Result Value Ref Range    Influenza A PCR Negative Negative    Influenza B PCR Negative Negative    RSV PCR Negative Negative    SARS CoV2 PCR Negative Negative    Narrative    Testing was performed using the Xpert Xpress CoV2/Flu/RSV Assay on the [employer] Instrument. This test should be ordered for the  detection of SARS-CoV-2, influenza, and RSV viruses in individuals who meet clinical and/or epidemiological criteria. Test performance is unknown in asymptomatic patients. This test is for in vitro diagnostic use under the FDA EUA for laboratories certified under CLIA to perform high or moderate complexity testing. This test has not been FDA cleared or approved. A negative result does not rule out the presence of PCR inhibitors in the specimen or target RNA in concentration below the limit of detection for the assay. If only one viral target is positive but coinfection with multiple targets is suspected, the sample should be re-tested with another FDA cleared, approved, or authorized test, if coinfection would change clinical management. This test was validated by the [facility] [employer]. These laboratories are certified under the Clinical Laboratory Improvement Amendments of 1988 (CLIA-88) as qualified to perform high complexity laboratory testing.       Medications   sodium chloride 0.9% BOLUS 1,000 mL (0 mLs Intravenous Stopped 7/15/24 2003)   iopamidol (ISOVUE-370) solution 500 mL (70 mLs Intravenous $Given 7/15/24 1943)   sodium chloride 0.9 % bag 100 mL for CT scan flush use (70 mLs Intravenous $Given 7/15/24 1942)       Assessments & Plan (with Medical Decision Making)     23 year old male who presents for evaluation of ongoing cough and shortness of breath.  Symptoms started 6 days ago with productive cough and fever. He was evaluated at urgent care and was diagnoses with pneumonia. He was started on antibiotics (Z-Anibal and Augmentin).  Patient was seen here in the emergency department 4 days ago for recheck of his symptoms and had chest x-ray showing patchy opacity in the right midlung and posterior left lower lobe, consistent with pneumonia.  He has completed the course of Azithromycin and has a few doses of Augmentin left.  He notes fevers have resolved in the last 24 hours, but he is now  becoming more short of breath.  He has difficulty breathing with any exertion, feels very labored.  Extreme fatigue.  No prior history of asthma.  He is otherwise healthy and takes no regular medications.    On exam he is ill appearing.  Lung sounds with rhonchi in bilateral bases.  He does appear a bit labored with his breathing, but is speaking in full sentences.  Negative COVID-19/influenza-RSV.  No leukocytosis.  Normal lactic acid. CRP elevated at 32.99, Procalcitonin is normal.  Normal electrolytes.  Normal kidney function labs.  Normal LFTs.  VBG abnormal with possibly respiratory acidosis.  Patient was ambulated in the department and had no hypoxia.    Given his persistent symptoms and labored breathing we did proceed with chest CT for further evaluation.  CT is negative for PE.  He has noted to have consolidation involving much of the left lower lobe and superior segment of the right lower lobe.  Patchy airspace infiltrates present elsewhere in both lungs consistent with pneumonia.  No pleural effusion.    Given the above findings we did obtain a viral PCR swab and results are pending.  Given he has no hypoxia and vitals appear stable patient appears well for discharge home.  We discussed the possibility of a viral pneumonia versus bacterial.  I recommend he complete his course of antibiotics.  He should have recheck in clinic within the next week to ensure things continue to improve.  He should return to the emergency department if he has return of fever, increased work of breathing, vomiting, or worse in any way.  Provided prescription for an albuterol inhaler to see if this would help with shortness of breath.      Discharge Medication List as of 7/15/2024  9:40 PM        START taking these medications    Details   albuterol (PROAIR HFA/PROVENTIL HFA/VENTOLIN HFA) 108 (90 Base) MCG/ACT inhaler Inhale 2 puffs into the lungs every 6 hours as needed for shortness of breath, wheezing or cough, Disp-18 g,  R-0, E-PrescribePharmacy may dispense brand covered by insurance (Proair, or proventil or ventolin or generic albuterol inhaler)             Final diagnoses:   Multifocal pneumonia       7/15/2024   [facility] EMERGENCY DEPT       Yamilka, ROLAND Tavares CNP  07/15/24 [id number]

## 2024-07-16 NOTE — ED NOTES
Patient taken for a walk around unit while monitoring oximetry. O2 levels on room air were 92%, dropped from 96% while resting. Heart rate increased to 109bpm from 86 resting. Patient denies chest pain or difficulty breathing, did appear to be working more to breathe. Provider updated.

## 2024-07-16 NOTE — TELEPHONE ENCOUNTER
S-(situation): Patients mom calling to schedule appt in clinic to check for bacterial pneumonia   Consent to communicate on file    B-(background): Pt seen in ER yesterday and 7/11 for multifocal pneumonia   Completed the course of Azithromycin and has one dose of Augmentin left   Sx onset 7/3 with vomiting (5 days), diarrhea (on and off for 10 days), fever resolved friday then progressed into productive cough, dyspnea, extreme fatigue and no appetite.      Viral panel was negative     A-(assessment): Mom states patient is still extremely fatigued, labored and very short of breath with minimal exertion (walking from bed to bathroom), cough is productive but sputum is clear when able to expel any.  Pt drinking a little water, voiding at least 1x in 8 hours    Denies nasal congestion, fever, chills, chest pain, confusion, hemoptysis, no wheezing heard,     R-(recommendations): Per protocol seen in clinic now  Mom requesting [facility] or [facility] for appt  Routing to clinic to follow-up and check availability    Advised [facility] if unable to be seen in clinic today - mom agreed with plan. If sxs worsen - go to ER      Meghan ALEJANDRO RN  [facility]      Reason for Disposition   [1] Taking antibiotic > 24 hours for pneumonia AND [2] breathing is worse    Additional Information   Negative: Severe difficulty breathing (e.g., struggling for each breath, speaks in single words)   Negative: Bluish (or gray) lips or face now   Negative: Difficult to awaken or acting confused (e.g., disoriented, slurred speech)   Negative: Stridor   Negative: Slow, shallow and weak breathing   Negative: Sounds like a life-threatening emergency to the triager   Negative: Recently discharged from hospital with diagnosis of pneumonia   Negative: New onset or worsening chest pain   Negative: MODERATE difficulty breathing (e.g., speaks in phrases, SOB even at rest, pulse 100-120)   Negative: Fever > 104 F (40 C)   Negative:  "[1] Taking antibiotic > 24 hours for pneumonia AND [2] fever > 103 F (39.4 C)   Negative: [1] Coughed up blood AND [2] large amount or blood clots (Exception: blood-tinged sputum)   Negative: Patient sounds very sick or weak to the triager   Negative: [1] Diabetes mellitus or weak immune system (e.g., HIV positive, cancer chemo, splenectomy, organ transplant, chronic steroids) AND [2] new onset of fever > 100.0 F (37.8 C)    Answer Assessment - Initial Assessment Questions  1. SYMPTOMS: \"What symptoms are you most concerned about?\" \"Is it better, the same, or worse compared to when you saw the doctor?\"      Shortness of breathing, extreme fatigue, and no appetite    2. BREATHING DIFFICULTY: \"Are you having any difficulty breathing?\" If so, ask \"How bad is it?\"  (e.g., none, mild, moderate, severe)    - MILD: No SOB at rest, mild SOB with walking, speaks normally in sentences, can lay down, no retractions, pulse < 100.     - MODERATE: SOB at rest, SOB with minimal exertion and prefers to sit, cannot lie down flat, speaks in phrases, mild retractions, audible wheezing, pulse 100-120.     - SEVERE: Very SOB at rest, speaks in single words, struggling to breathe, sitting hunched forward, retractions, pulse > 120       Moderate -is short of breath walking from bed to the bathroom (10 ft distance)  3. FEVER: \"Do you have a fever?\" If so, ask: \"What is your temperature, how was it measured, and when did it start?\"      Temp now is 99  4. SPUTUM: \"Describe the color of your sputum\" (clear, white, yellow, green, blood-tinged)      Clear when comes up  5. DIAGNOSIS CONFIRMATION: \"When was the pneumonia diagnosed?\" \"By whom?\"      Dx 7/11 in ER   6. ANTIBIOTIC: \"Are you taking an antibiotic?\"  If so, \"Which one?\" \"When was it started?\"      course of Azithromycin and has one dose of Augmentin left   7. OTHER TREATMENT: \"Are you receiving any other treatment for the pneumonia?\" (e.g., albuterol nebulizer, oxygen) If so, ask, " "\"How often?\" and \"Do they help?\"      Albuterol inhaler   8. HOSPITAL ADMISSION: \"Were you hospitalized for this pneumonia?\" If so, ask \"When were you discharged home from the hospital?\"      No - just ER    Protocols used: Pneumonia on Antibiotic Follow-up Call-A-AH    "

## 2024-08-29 NOTE — TELEPHONE ENCOUNTER
No answer, will try later. Angela Julian CMA ([facility])     The pt states that she will call to schedule the requested imaging at a [facility] location.  Iggy Andrew on 8/29/2024 at 12:44 PM

## 2024-09-08 ENCOUNTER — HEALTH MAINTENANCE LETTER (OUTPATIENT)
Age: 24
End: 2024-09-08